# Patient Record
Sex: FEMALE | Race: ASIAN | NOT HISPANIC OR LATINO | URBAN - METROPOLITAN AREA
[De-identification: names, ages, dates, MRNs, and addresses within clinical notes are randomized per-mention and may not be internally consistent; named-entity substitution may affect disease eponyms.]

---

## 2017-08-29 ENCOUNTER — EMERGENCY (EMERGENCY)
Facility: HOSPITAL | Age: 22
LOS: 1 days | Discharge: HOME | End: 2017-08-29
Attending: EMERGENCY MEDICINE | Admitting: EMERGENCY MEDICINE
Payer: COMMERCIAL

## 2017-08-29 VITALS
WEIGHT: 115.08 LBS | HEART RATE: 74 BPM | RESPIRATION RATE: 18 BRPM | OXYGEN SATURATION: 99 % | TEMPERATURE: 99 F | SYSTOLIC BLOOD PRESSURE: 111 MMHG | DIASTOLIC BLOOD PRESSURE: 74 MMHG

## 2017-08-29 DIAGNOSIS — Z79.899 OTHER LONG TERM (CURRENT) DRUG THERAPY: ICD-10-CM

## 2017-08-29 DIAGNOSIS — N39.0 URINARY TRACT INFECTION, SITE NOT SPECIFIED: ICD-10-CM

## 2017-08-29 DIAGNOSIS — R30.0 DYSURIA: ICD-10-CM

## 2017-08-29 LAB
APPEARANCE UR: (no result)
BILIRUB UR-MCNC: NEGATIVE — SIGNIFICANT CHANGE UP
COLOR SPEC: (no result)
DIFF PNL FLD: (no result)
GLUCOSE UR QL: 250
KETONES UR-MCNC: (no result) MG/DL
LEUKOCYTE ESTERASE UR-ACNC: (no result)
NITRITE UR-MCNC: POSITIVE
PH UR: 5 — SIGNIFICANT CHANGE UP (ref 5–8)
PROT UR-MCNC: >=300 MG/DL
SP GR SPEC: 1.02 — SIGNIFICANT CHANGE UP (ref 1–1.03)
UROBILINOGEN FLD QL: >=8 E.U./DL

## 2017-08-29 PROCEDURE — 87186 SC STD MICRODIL/AGAR DIL: CPT

## 2017-08-29 PROCEDURE — 99283 EMERGENCY DEPT VISIT LOW MDM: CPT

## 2017-08-29 PROCEDURE — 99283 EMERGENCY DEPT VISIT LOW MDM: CPT | Mod: 25

## 2017-08-29 PROCEDURE — 87184 SC STD DISK METHOD PER PLATE: CPT

## 2017-08-29 PROCEDURE — 81001 URINALYSIS AUTO W/SCOPE: CPT

## 2017-08-29 PROCEDURE — 87086 URINE CULTURE/COLONY COUNT: CPT

## 2017-08-29 PROCEDURE — 99053 MED SERV 10PM-8AM 24 HR FAC: CPT

## 2017-08-29 RX ORDER — NITROFURANTOIN MACROCRYSTAL 50 MG
1 CAPSULE ORAL
Qty: 14 | Refills: 0 | OUTPATIENT
Start: 2017-08-29 | End: 2017-09-05

## 2017-08-29 NOTE — ED ADULT NURSE NOTE - OBJECTIVE STATEMENT
20 y/o female c/o painful and burning when urinating and suprapubic pain today, pt denies any blood in the urine, awaiting urine sample. Will continue to monitor. 20 y/o female c/o painful and burning when urinating and suprapubic pain today, pt denies any blood in the urine, awaiting urine sample. pt repots taking AZO (OTC meds) for pain control without relief.  Will continue to monitor.

## 2017-08-29 NOTE — ED PROVIDER NOTE - OBJECTIVE STATEMENT
Pt with no sig PMHx p/w 1 day of dysuria, frequency, urgency. No abdominal pain, flank pain, n/v/d, or hematuria. No f/c. Pt taking Azo w/o relief. No vaginal bleeding or discharge. Recent LMP. No hx STDs. Pt reports she is not pregnant.

## 2017-09-02 LAB
-  CEFAZOLIN: SIGNIFICANT CHANGE UP
-  LINEZOLID: SIGNIFICANT CHANGE UP
-  OXACILLIN: SIGNIFICANT CHANGE UP
-  PENICILLIN: SIGNIFICANT CHANGE UP
-  RIFAMPIN: SIGNIFICANT CHANGE UP
-  TRIMETHOPRIM/SULFAMETHOXAZOLE: SIGNIFICANT CHANGE UP
-  VANCOMYCIN: SIGNIFICANT CHANGE UP
METHOD TYPE: SIGNIFICANT CHANGE UP

## 2017-09-03 LAB
-  AMPICILLIN/SULBACTAM: SIGNIFICANT CHANGE UP
-  AMPICILLIN: SIGNIFICANT CHANGE UP
-  CEFAZOLIN: SIGNIFICANT CHANGE UP
-  CEFTRIAXONE: SIGNIFICANT CHANGE UP
-  CEFTRIAXONE: SIGNIFICANT CHANGE UP
-  CIPROFLOXACIN: SIGNIFICANT CHANGE UP
-  GENTAMICIN: SIGNIFICANT CHANGE UP
-  NITROFURANTOIN: SIGNIFICANT CHANGE UP
-  PIPERACILLIN/TAZOBACTAM: SIGNIFICANT CHANGE UP
-  TOBRAMYCIN: SIGNIFICANT CHANGE UP
-  TRIMETHOPRIM/SULFAMETHOXAZOLE: SIGNIFICANT CHANGE UP
CULTURE RESULTS: SIGNIFICANT CHANGE UP
METHOD TYPE: SIGNIFICANT CHANGE UP
METHOD TYPE: SIGNIFICANT CHANGE UP
ORGANISM # SPEC MICROSCOPIC CNT: SIGNIFICANT CHANGE UP
SPECIMEN SOURCE: SIGNIFICANT CHANGE UP

## 2017-09-04 RX ORDER — AZTREONAM 2 G
1 VIAL (EA) INJECTION
Qty: 14 | Refills: 0 | OUTPATIENT
Start: 2017-09-04 | End: 2017-09-11

## 2023-08-08 NOTE — ED ADULT TRIAGE NOTE - TEMPERATURE IN CELSIUS (DEGREES C)
"-- DO NOT REPLY / DO NOT REPLY ALL --  -- Message is from [address] [facility] ([id number] [facility]) --    General Patient Message:      Patient states she had a booster done on 9/7/22 [facility] [address] [place] on 9/7/22. This is not listed in her chart. This is not documented in her chart. Writer just wants to make sure the patient is due for another COVID Booster. Please call the patient to discuss once again as patient is not sure if she needs another booster or not. Please call patient to discuss                                         Alternative phone number:     Can a detailed message be left? Yes    Message Turnaround: Gwenith Party:    Refer to site's KB page for routing instructions    Please give this turnaround time to the caller:    ""You can expect to receive a response 1-3 business days after your provider's clinical team reviews the message\""              " 37.4